# Patient Record
Sex: MALE | Race: WHITE | URBAN - METROPOLITAN AREA
[De-identification: names, ages, dates, MRNs, and addresses within clinical notes are randomized per-mention and may not be internally consistent; named-entity substitution may affect disease eponyms.]

---
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
## 2022-01-01 LAB
ALBUMIN UR-MCNC: NEGATIVE G/DL
APPEARANCE UR: CLEAR
BILIRUB UR QL STRIP: NEGATIVE
COLOR UR AUTO: YELLOW
GLUCOSE UR STRIP-MCNC: NEGATIVE MG/DL
HGB UR QL STRIP: NEGATIVE
KETONES UR STRIP-MCNC: NEGATIVE MG/DL
LEUKOCYTE ESTERASE UR QL STRIP: NEGATIVE
NITRATE UR QL: NEGATIVE
PH UR STRIP: 6 [PH]
SP GR UR STRIP: 1.03
UROBILINOGEN UR STRIP-MCNC: NORMAL MG/DL

## 2022-01-09 ENCOUNTER — HEALTH MAINTENANCE LETTER (OUTPATIENT)
Age: 22
End: 2022-01-09

## 2022-02-11 VITALS
SYSTOLIC BLOOD PRESSURE: 129 MMHG | HEART RATE: 69 BPM | WEIGHT: 182 LBS | DIASTOLIC BLOOD PRESSURE: 80 MMHG | HEIGHT: 68 IN | BODY MASS INDEX: 27.58 KG/M2

## 2022-02-11 VITALS
SYSTOLIC BLOOD PRESSURE: 137 MMHG | HEART RATE: 89 BPM | OXYGEN SATURATION: 98 % | BODY MASS INDEX: 28.72 KG/M2 | DIASTOLIC BLOOD PRESSURE: 82 MMHG | WEIGHT: 186.1 LBS | TEMPERATURE: 98.5 F

## 2022-02-16 NOTE — NURSING NOTE
Depression Screening Entered On:  9/29/2021 9:56 AM CDT    Performed On:  9/29/2021 9:56 AM CDT by Freda Blanchard CMA               Depression Screening   Little Interest - Pleasure in Activities :   Not at all   Feeling Down, Depressed, Hopeless :   Not at all   Initial Depression Screen Score :   0 Score   Poor Appetite or Overeating :   Not at all   Trouble Falling or Staying Asleep :   Not at all   Feeling Tired or Little Energy :   Several days   Feeling Bad About Yourself :   Not at all   Trouble Concentrating :   Not at all   Moving or Speaking Slowly :   Not at all   Thoughts Better Off Dead or Hurting Self :   Not at all   Difficulty at Work, Home, Getting Along :   Not difficult at all   Detailed Depression Screen Score :   1    Total Depression Screen Score :   1    Freda Blanchard CMA - 9/29/2021 9:56 AM CDT

## 2022-02-16 NOTE — PROGRESS NOTES
Patient:   RANDOLPH ALFARO            MRN: [id number]            FIN: [id number]               Age:   21 years     Sex:  Male     :  2000   Associated Diagnoses:   Acute mucous pneumonia   Author:   Rocco Richmond PA-C      Report Summary   Diagnosis  Acute mucous pneumonia ([id number] J18.9).  Patient InstructionsOrders   Visit Information      Date of Service: 2021 02:52 pm  Performing Location: [facility]  Encounter#: [id number]      Primary Care Provider (PCP):  NONE ,       Referring Provider:  Rocco Richmond PA-C    NPI# [id number]   Visit type:  New symptom.    Source of history:  Self, Medical record.    History limitation:  None.       Chief Complaint   2021 3:03 PM CST   c/o cough, hurts in chest when laying down x 1 month, negative covid test today        History of Present Illness             The patient presents with cough.  The cough is described as hacking.  The severity of the cough is moderate.  The cough is episodic, fluctuates in intensity and is worsening.  The cough has lasted for 4 week(s).  The context of the cough: occurred in association with illness.  Exacerbating factors consist of lying flat.  Relieving factors consist of rest.  Associated symptoms consist of nasal congestion, denies fever and denies shortness of breath.        Review of Systems   Eye:  Negative.    Ear/Nose/Mouth/Throat:  Negative except as documented in history of present illness.    Respiratory:  Negative except as documented in history of present illness.       Health Status   Allergies:    Allergic Reactions (All)  No Known Medication Allergies   Medications:  (Selected)   Prescriptions  Prescribed  Zithromax 500 mg oral tablet: = 1 tab(s) ( 500 mg ), PO, Daily, # 5 tab(s), 0 Refill(s), Type: Maintenance, Pharmacy: [id number], 1 tab(s) Oral daily,x5 day(s), 67.5, in, 21 9:21:00 CDT, Height Measured, 186.1, lb, 21 15:03:00 CST, Weight  Measured  Documented Medications  Documented  Multiple Vitamins oral tablet, chewable: 1 tab(s), Chewed, daily, # 30 tab(s), 0 Refill(s), Type: Maintenance,    Medications          *denotes recorded medication          Zithromax 500 mg oral tablet: 500 mg, 1 tab(s), PO, Daily, for 5 day(s), 5 tab(s), 0 Refill(s).          *Multiple Vitamins oral tablet, chewable: 1 tab(s), Chewed, daily, 30 tab(s), 0 Refill(s).       Problem list:    No problem items selected or recorded.      Histories   Past Medical History:    No active or resolved past medical history items have been selected or recorded.   Family History:    Alcohol abuse  Father  Mental illness  Mother  Grandmother (M)  Obesity  Mother  Grandmother (M)  High cholesterol  Father     Procedure history:    No active procedure history items have been selected or recorded.   Social History:        Electronic Cigarette/Vaping Assessment            Electronic Cigarette Use: Never.      Alcohol Assessment            Never      Tobacco Assessment            Never (less than 100 in lifetime)      Substance Abuse Assessment            Never      Employment and Education Assessment            Student      Home and Environment Assessment            Marital status: Single.  Living situation: Home/Independent.  Feels unsafe at home: No.  Family/Friends               available for support: Yes.      Nutrition and Health Assessment            Type of diet: Regular.  Wants to lose weight: Yes.  Sleeping concerns: No.  Feels highly stressed: No.      Exercise and Physical Activity Assessment            Exercise frequency: 5-6 times/week.      Sexual Assessment            Sexually active: No.  Identifies as male, Sexual orientation: Straight or heterosexual.  History of STD: No.               Contraceptive Use Details: Abstinence.  History of sexual abuse: No.        Physical Examination   Vital Signs   11/29/2021 3:03 PM CST Temperature Tympanic 98.5 DegF    Peripheral Pulse  Rate 89 bpm    HR Method Electronic    Systolic Blood Pressure 137 mmHg  HI    Diastolic Blood Pressure 82 mmHg  HI    Mean Arterial Pressure 100 mmHg    BP Site Right arm    BP Method Electronic    Oxygen Saturation 98 %      Measurements from flowsheet : Measurements   11/29/2021 3:03 PM CST Weight Measured - Standard 186.1 lb    Weight Percentile 100.00    Weight Z-score 3.93      General:  Alert and oriented, No acute distress.    Eye:  Pupils are equal, round and reactive to light, Extraocular movements are intact, Normal conjunctiva.    HENT:  Normocephalic, Oral mucosa is moist, No pharyngeal erythema.    Neck:  Supple, Non-tender, No lymphadenopathy.    Respiratory:  Lungs are clear to auscultation, Respirations are non-labored, Breath sounds are equal.    Cardiovascular:  Normal rate, Regular rhythm, No murmur.    Psychiatric:  Cooperative, Appropriate mood & affect.       Impression and Plan   Diagnosis     Acute mucous pneumonia ([id number] J18.9).     Patient Instructions:       Counseled: Patient, Regarding diagnosis, Regarding treatment, Regarding medications, Verbalized understanding.    Orders     Orders (Selected)   Prescriptions  Prescribed  Zithromax 500 mg oral tablet: = 1 tab(s) ( 500 mg ), PO, Daily, # 5 tab(s), 0 Refill(s), Type: Maintenance, Pharmacy: [facility] #[id number], 1 tab(s) Oral daily,x5 day(s), 67.5, in, 09/29/21 9:21:00 CDT, Height Measured, 186.1, lb, 11/29/21 15:03:00 CST, Weight Measured.     Take medicine as prescribed, side effects discussed.  Tylenol/ibuprofen for fever and discomfort.  Push fluids.  RTC if not improving in 36-48 hours, prior if concerns as we have discussed.

## 2022-02-16 NOTE — NURSING NOTE
Comprehensive Intake Entered On:  11/29/2021 3:07 PM CST    Performed On:  11/29/2021 3:03 PM CST by Freda Blanchard CMA               Summary   Chief Complaint :   c/o cough, hurts in chest when laying down x 1 month, negative covid test today   Weight Measured :   186.1 lb(Converted to: 186 lb 2 oz, 84.414 kg)    Systolic Blood Pressure :   137 mmHg (HI)    Diastolic Blood Pressure :   82 mmHg (HI)    Mean Arterial Pressure :   100 mmHg   Peripheral Pulse Rate :   89 bpm   BP Site :   Right arm   BP Method :   Electronic   HR Method :   Electronic   Temperature Tympanic :   98.5 DegF(Converted to: 36.9 DegC)    Oxygen Saturation :   98 %   Freda Blanchard CMA - 11/29/2021 3:03 PM CST   Health Status   Allergies Verified? :   Yes   Medication History Verified? :   Yes   Medical History Verified? :   Yes   Tobacco Use? :   Never smoker   Freda Blanchard CMA - 11/29/2021 3:03 PM CST   Consents   Consent for Immunization Exchange :   Consent Granted   Consent for Immunizations to Providers :   Consent Granted   Freda Blanchard CMA - 11/29/2021 3:03 PM CST   Meds / Allergies   (As Of: 11/29/2021 3:07:53 PM CST)   Allergies (Active)   No Known Medication Allergies  Estimated Onset Date:   Unspecified ; Created By:   Freda Blanchard CMA; Reaction Status:   Active ; Category:   Drug ; Substance:   No Known Medication Allergies ; Type:   Allergy ; Updated By:   Freda Blanchard CMA; Reviewed Date:   11/29/2021 3:05 PM CST        Medication List   (As Of: 11/29/2021 3:07:53 PM CST)   Home Meds    multivitamin  :   multivitamin ; Status:   Documented ; Ordered As Mnemonic:   Multiple Vitamins oral tablet, chewable ; Simple Display Line:   1 tab(s), Chewed, daily, 30 tab(s), 0 Refill(s) ; Catalog Code:   multivitamin ; Order Dt/Tm:   9/29/2021 9:22:15 AM CDT

## 2022-02-16 NOTE — PROGRESS NOTES
Patient:   RANDOLPH ALFARO            MRN: [id number]            FIN: [id number]               Age:   21 years     Sex:  Male     :  2000   Associated Diagnoses:   Dysuria; Well adult exam; Sprain of lateral collateral ligament of left knee   Author:   Rocco Richmond PA-C      Visit Information      Date of Service: 2021 09:10 am  Performing Location: [facility]  Encounter#: [id number]      Primary Care Provider (PCP):  NONE ,       Referring Provider:  Rocco Richmond PA-C    NPI# [id number]   Visit type:  Annual exam.    Accompanied by:  No one.    Source of history:  Self, Medical record.    Referral source:  Self.    History limitation:  None.       Chief Complaint   2021 9:21 AM CDT    Physical      Well Adult History   Well Adult History             The patient presents for well adult exam.  The patient's general health status is described as good.  The patient's diet is described as balanced.  Exercise: routine.  Associated symptoms consist of weight gain.  Additional pertinent history: no caffeine use, tobacco use none and no alcohol use.  Check left knee. Lateral pain after twisting playing flag football. .  Has gained a few pounds since being back on campus. Has noticed looser stools. No blood. Wants to get back on his diet. Bowels were fine then..        Review of Systems   Constitutional:  Negative.    Eye:  Negative.    Ear/Nose/Mouth/Throat:  Nasal congestion.    Respiratory:  Cough.    Cardiovascular:  Negative.    Gastrointestinal:  Diarrhea.    Genitourinary:  Dysuria.    Hematology/Lymphatics:  Negative.    Endocrine:  Negative.    Immunologic:  Negative.    Musculoskeletal:  Joint pain.    Integumentary:  Negative.    Neurologic:  Negative.    Psychiatric:  Negative.       Health Status   Allergies:    Allergic Reactions (Selected)  No Known Medication Allergies   Medications:  (Selected)   Documented Medications  Documented  Multiple Vitamins oral tablet,  chewable: 1 tab(s), Chewed, daily, # 30 tab(s), 0 Refill(s), Type: Maintenance      Histories   Past Medical History:    No active or resolved past medical history items have been selected or recorded.   Family History:    No family history items have been selected or recorded.   Procedure history:    No active procedure history items have been selected or recorded.   Social History:        Electronic Cigarette/Vaping Assessment            Electronic Cigarette Use: Never.      Tobacco Assessment            Never (less than 100 in lifetime)        Physical Examination   Vital Signs   9/29/2021 9:21 AM CDT Peripheral Pulse Rate 69 bpm    HR Method Electronic    Systolic Blood Pressure 129 mmHg    Diastolic Blood Pressure 80 mmHg    Mean Arterial Pressure 96 mmHg    BP Site Right arm    BP Method Electronic      Measurements from flowsheet : Measurements   9/29/2021 9:21 AM CDT Height Measured - Standard 67.5 in    Height/Length Percentile 0.00    Height/Length Z-score -14.06    Weight Measured - Standard 182 lb    Weight Percentile 99.99    Weight Z-score 3.86    BSA 1.98 m2    Body Mass Index 28.08 kg/m2    Body Mass Index Percentile 91.15    BMI Z-score 1.35      General:  Alert and oriented, No acute distress.    Eye:  Pupils are equal, round and reactive to light, Extraocular movements are intact, Normal conjunctiva.    HENT:  Normocephalic, Tympanic membranes are clear, Oral mucosa is moist, No pharyngeal erythema.    Neck:  Supple, Non-tender, No lymphadenopathy.    Respiratory:  Lungs are clear to auscultation, Respirations are non-labored, Breath sounds are equal.    Cardiovascular:  Normal rate, Regular rhythm, No murmur.    Gastrointestinal:  Soft, Non-tender, Non-distended, Normal bowel sounds, No organomegaly.    Genitourinary:  No costovertebral angle tenderness.    Musculoskeletal:  Normal strength, No tenderness, No swelling, No deformity, Normal gait, Mild left knee LCL tenderness. No laxity..     Integumentary:  No rash.    Neurologic:  No focal deficits.    Psychiatric:  Cooperative, Appropriate mood & affect, Normal judgment, Non-suicidal.       Health Maintenance      Recommendations     Pending (in the next year)        OverDue           COVID-19 Vaccine (Moderna) Dose 2 due  05/27/21  One-time only        Due            Influenza Vaccine due  09/01/21  and every 1  year(s)           Alcohol Misuse Screen due  09/29/21  and every 1  year(s)           Depression Screen due  09/29/21  and every 1  year(s)           HIV Screen (if sexually active) due  09/29/21  and every 1  year(s)           STD Counseling (if sexually active) due  09/29/21  and every 1  year(s)           Syphilis Screen (if sexually active) due  09/29/21  and every 1  year(s)     Satisfied (in the past 1 year)        Satisfied            Body Mass Index Check on  09/29/21.           COVID-19 Vaccine (Moderna) Dose 1 on  04/29/21.           High Blood Pressure Screen on  09/29/21.           Tobacco Use Screen on  09/29/21.          Review / Management   Results review:  UA concentrated otherwise normal..       Impression and Plan   Diagnosis     Dysuria (QAC36-DB R30.0).     Well adult exam (FTX68-GS Z00.00).     Sprain of lateral collateral ligament of left knee (XUI14-AN S83.422A).     Patient Instructions:       Counseled: Patient, Regarding diagnosis, Regarding medications, Verbalized understanding.    Summary:  FU if diarrhea persists. Ice and Advil PRN. If stools remain loose when back on his usual diet, he will let us know..    Counseled on health diet and weight. Push fluids. FU if urine symptoms persist

## 2022-02-16 NOTE — NURSING NOTE
CAGE Assessment Entered On:  9/29/2021 9:56 AM CDT    Performed On:  9/29/2021 9:56 AM CDT by Freda Blanchard CMA               Assessment   Have you ever felt you should cut down on your drinking :   No   Have people annoyed you by criticizing your drinking :   No   Have you ever felt bad or guilty about your drinking :   No   Have you ever taken a drink first thing in the morning to steady your nerves or get rid of a hangover (Eye-opener) :   No   CAGE Score :   0    Freda Blanchard CMA - 9/29/2021 9:56 AM CDT

## 2022-02-16 NOTE — NURSING NOTE
Comprehensive Intake Entered On:  9/29/2021 9:23 AM CDT    Performed On:  9/29/2021 9:21 AM CDT by Freda Blanchard CMA               Summary   Chief Complaint :   Physical   Weight Measured :   182 lb(Converted to: 182 lb 0 oz, 82.554 kg)    Height Measured :   67.5 in(Converted to: 5 ft 7 in, 171.45 cm)    Body Mass Index :   28.08 kg/m2   Body Surface Area :   1.98 m2   Systolic Blood Pressure :   129 mmHg   Diastolic Blood Pressure :   80 mmHg   Mean Arterial Pressure :   96 mmHg   Peripheral Pulse Rate :   69 bpm   BP Site :   Right arm   BP Method :   Electronic   HR Method :   Electronic   Freda Blanchard CMA - 9/29/2021 9:21 AM CDT   Health Status   Allergies Verified? :   Yes   Medication History Verified? :   Yes   Medical History Verified? :   Yes   Tobacco Use? :   Never smoker   Freda Blanchard CMA - 9/29/2021 9:21 AM CDT   Consents   Consent for Immunization Exchange :   Consent Granted   Consent for Immunizations to Providers :   Consent Granted   Freda Blanchard CMA - 9/29/2021 9:21 AM CDT   Problems   (As Of: 9/29/2021 9:23:37 AM CDT)   Meds / Allergies   (As Of: 9/29/2021 9:23:37 AM CDT)   Allergies (Active)   No Known Medication Allergies  Estimated Onset Date:   Unspecified ; Created By:   Freda Blanchard CMA; Reaction Status:   Active ; Category:   Drug ; Substance:   No Known Medication Allergies ; Type:   Allergy ; Updated By:   Freda Blanchard CMA; Reviewed Date:   9/29/2021 9:22 AM CDT        Medication List   (As Of: 9/29/2021 9:23:37 AM CDT)   Home Meds    multivitamin  :   multivitamin ; Status:   Documented ; Ordered As Mnemonic:   Multiple Vitamins oral tablet, chewable ; Simple Display Line:   1 tab(s), Chewed, daily, 30 tab(s), 0 Refill(s) ; Catalog Code:   multivitamin ; Order Dt/Tm:   9/29/2021 9:22:15 AM CDT            Social History   Social History   (As Of: 9/29/2021 9:23:37 AM CDT)   Tobacco:        Never (less than 100 in lifetime)   (Last Updated: 9/29/2021 9:21:51 AM CDT by Santo GUALLPA  Freda)          Electronic Cigarette/Vaping:        Electronic Cigarette Use: Never.   (Last Updated: 9/29/2021 9:21:54 AM CDT by Frdea Blanchard CMA)

## 2022-03-02 NOTE — NURSING NOTE
Urine Dipstick POC Entered On:  1/1/2022 12:59 PM CST    Performed On:  9/29/2021 12:58 PM CDT by Geno Cummings               Urine Dipstick POC   Urine Color Urine Dipstick :   Yellow   Urine Appearance Urine Dipstick :   Clear   Glucose Urine Dipstick :   Negative   Specific Gravity Urine Dipstick :   1.030   Blood Urine Dipstick :   Negative   pH Urine Dipstick :   6   Protein Urine Dipstick :   Negative   Bilirubin Urine Dipstick :   Negative   Urobilinogen Urine Dipstick :   0.2 mg/dl   Nitrite Urine Dipstick :   Negative   Leukocytes Urine Dipstick :   Negative   Ketones Urine Dipstick :   Negative   POC Test Comments :   [facility]   Geno Cummings - 1/1/2022 12:58 PM CST

## 2022-11-21 ENCOUNTER — HEALTH MAINTENANCE LETTER (OUTPATIENT)
Age: 22
End: 2022-11-21

## 2023-11-25 ENCOUNTER — HEALTH MAINTENANCE LETTER (OUTPATIENT)
Age: 23
End: 2023-11-25